# Patient Record
Sex: FEMALE | Race: BLACK OR AFRICAN AMERICAN | NOT HISPANIC OR LATINO | Employment: OTHER | URBAN - METROPOLITAN AREA
[De-identification: names, ages, dates, MRNs, and addresses within clinical notes are randomized per-mention and may not be internally consistent; named-entity substitution may affect disease eponyms.]

---

## 2024-09-14 ENCOUNTER — OFFICE VISIT (OUTPATIENT)
Dept: URGENT CARE | Age: 69
End: 2024-09-14
Payer: MEDICARE

## 2024-09-14 VITALS — DIASTOLIC BLOOD PRESSURE: 72 MMHG | SYSTOLIC BLOOD PRESSURE: 115 MMHG | HEART RATE: 72 BPM | TEMPERATURE: 98.8 F

## 2024-09-14 DIAGNOSIS — J01.90 ACUTE NON-RECURRENT SINUSITIS, UNSPECIFIED LOCATION: Primary | ICD-10-CM

## 2024-09-14 PROCEDURE — 1159F MED LIST DOCD IN RCRD: CPT | Performed by: PHYSICIAN ASSISTANT

## 2024-09-14 PROCEDURE — 99203 OFFICE O/P NEW LOW 30 MIN: CPT | Performed by: PHYSICIAN ASSISTANT

## 2024-09-14 PROCEDURE — 1160F RVW MEDS BY RX/DR IN RCRD: CPT | Performed by: PHYSICIAN ASSISTANT

## 2024-09-14 PROCEDURE — 87811 SARS-COV-2 COVID19 W/OPTIC: CPT | Performed by: PHYSICIAN ASSISTANT

## 2024-09-14 RX ORDER — DOXYCYCLINE 100 MG/1
100 CAPSULE ORAL 2 TIMES DAILY
Qty: 20 CAPSULE | Refills: 0 | Status: SHIPPED | OUTPATIENT
Start: 2024-09-14 | End: 2024-09-24

## 2024-09-14 NOTE — PATIENT INSTRUCTIONS
Most of the time, sinusitis does NOT need to be treated with antibiotics. This is because most sinusitis is caused by viruses - not bacteria - and antibiotics do not kill viruses. Many people get over sinus infections without antibiotics.  Some people with sinusitis do need treatment with antibiotics. If your symptoms have not improved after 10 - 14 days, please see your Primary Care Physician. Your doctor might recommend that you wait 1 more week to see if your symptoms improve. But if you have symptoms such as a fever, he or she might prescribe antibiotics. It is important to follow your doctor's instructions about taking your antibiotics.    Sinusitis is a condition that can cause a stuffy nose, pain in the face, and discharge (mucus) from the nose. The sinuses are hollow areas in the bones of the face. They have a thin lining that normally makes a small amount of mucus. When this lining gets irritated or infected, it swells and makes extra mucus. This causes symptoms.  Sinusitis can occur when a person gets sick with a cold. The germs causing the cold can also infect the sinuses. Many times, a person feels like his or her cold is getting better. But then he or she gets sinusitis and begins to feel sick again.    Common symptoms of sinusitis include:  -Stuffy or blocked nose  -Thick white, yellow, or green discharge from the no  -Pain in the teeth  -Pain or pressure in the face - This often feels worse when a person bends forward.    People with sinusitis can also have other symptoms that include:  -Fever  -Coughi  -Trouble smelling  -Ear pressure or fullness  -Headache  -Bad breath  -Feeling tired    Most of the time, symptoms start to improve in 7 to 10 days.    To reduce your symptoms, you you could ask I can change it if she does not want it I was wrong  can:  -Take an over-the-counter pain reliever to reduce the pain  -Rinse your nose and sinuses with salt water a few times a day - Ask your doctor or nurse  about the best way to do this.  Your doctor might also prescribe a steroid nose spray to reduce the swelling in your nose.     Home Care:   1. Take medication as prescribed.  2. Increase fluids, to help thin congestion.  3. You might use a cool mist humidifier/vaporizer in your room if the air is dry. This will help thin congestion and keep your sinus moist.   4. Sleeping in a more upright position can be helpful.   5. Using saline nose drops or mists can also help thick sinus congestion drain. Apply the mist or drops, then tip your head back and rotate from side to side to help    6. You can use acetaminophen (paracetamol, Tylenol) or ibuprofen (Motrin) for fever or headache.   7. Drink warm teas with honey.  8. If you do not improve or you begin to worsen please follow up with your primary care physician.    Call your doctor or go to the emergency department if worse or:  1. Breathing trouble occurs.   2. Color is pale, bluish, or gray.   3. Child is weak or too sleepy.   4. No urination occurs in 12 hours.   5. Fever lasts for more than 2 days.

## 2024-09-14 NOTE — PROGRESS NOTES
Subjective   Patient ID: Jeri Vazquez is a 69 y.o. female. They present today with a chief complaint of Sinus Problem (Day 6- nasal congestion with burning sensation, yellow mucus, with post nasal drip ).    History of Present Illness  HPI  Patient presents to the urgent care with approximately 1 week history of left-sided nasal congestion, burning, pressure.  Patient reports she feels pressure in her forehead, and entire left side of her face.  Patient states her sinus drainage is yellow, and has a foul taste and odor.  Past Medical History  Allergies as of 09/14/2024 - Reviewed 09/14/2024   Allergen Reaction Noted    Shellfish derived Unknown 09/14/2024    Penicillins Rash 09/14/2024       (Not in a hospital admission)       Past Medical History:   Diagnosis Date    Anemia, unspecified 03/19/2014    Anemia    Encounter for screening mammogram for malignant neoplasm of breast     Visit for screening mammogram    Other specified abnormal findings of blood chemistry 06/20/2013    Abnormal liver function test    Personal history of other diseases of the digestive system     History of intestinal obstruction    Personal history of other endocrine, nutritional and metabolic disease     History of hyperlipidemia    Personal history of other endocrine, nutritional and metabolic disease 06/20/2013    History of type 2 diabetes mellitus    Personal history of other endocrine, nutritional and metabolic disease 06/20/2013    History of hypothyroidism    Personal history of other endocrine, nutritional and metabolic disease 06/20/2013    History of obesity    Personal history of other infectious and parasitic diseases 10/15/2013    History of trichomoniasis    Personal history of other mental and behavioral disorders     History of anxiety disorder    Personal history of other mental and behavioral disorders     History of depression    Proteinuria, unspecified     Microalbuminuria    Trichomonal vulvovaginitis 06/27/2013     Trichomonal vulvovaginitis    Vitamin D deficiency, unspecified     Vitamin D deficiency       Past Surgical History:   Procedure Laterality Date    BREAST LUMPECTOMY  2013    Breast Surgery Lumpectomy     SECTION, CLASSIC  10/15/2013     Section    COLONOSCOPY  2013    Complete Colonoscopy    TOTAL ABDOMINAL HYSTERECTOMY  2013    Total Abdominal Hysterectomy    TUBAL LIGATION  10/15/2013    Tubal Ligation        reports that she has never smoked. She does not have any smokeless tobacco history on file. She reports that she does not drink alcohol and does not use drugs.    Review of Systems  Review of Systems  Patient denies sore throat, fever, nausea, diarrhea, vomiting, rash, dizziness, shortness of breath, increased urinary frequency, chills, peripheral edema, abdominal pain, chest pain.            Objective    Vitals:    24 1045   BP: 115/72   BP Location: Left arm   Patient Position: Sitting   BP Cuff Size: Large adult   Pulse: 72   Temp: 37.1 °C (98.8 °F)   TempSrc: Oral     No LMP recorded.    Physical Exam  Appearance: Alert, oriented, cooperative, in no acute distress. Well nourished & well hydrated.    Skin: Intact, no lesions, rash, petechiae or purpura.     Eyes: Conjunctiva pink with no redness or exudates. Eyelids without lesions. No scleral icterus.     ENT: Hearing grossly intact. External inspection of ears without lesions or erythema. no nasal flaring. no tripoding, no drooling, no difficulty swallowing oral secretions.  Bilateral tympanic membrane's nonbulging, external auditory canals unremarkable, no cervical or submandibular lymphadenopathy    Pulmonary: Clear bilaterally with good chest wall excursion. No rales, rhonchi or wheezing. No accessory muscle use or stridor. No difficulty completing a full sentence without taking a breath, no labored breathing w ambulation     Cardiac: Normal S1, S2, no rub, gallop. No JVD.    Musculoskeletal: no edema, or  deformity. No cyanosis, clubbing.    Neurological: no focal findings identified.    Psychiatric: Appropriate mood and affect.    Procedures    Point of Care Test & Imaging Results from this visit  No results found for this visit on 09/14/24.   No results found.    Diagnostic study results (if any) were reviewed by Hermila Pulido PA-C.    Assessment/Plan   Allergies, medications, history, and pertinent labs/EKGs/Imaging reviewed by Hermila Pulido PA-C.     Medical Decision Making  Considerations for patient's symptoms include viral/bacterial infection, and seasonal allergies. Given symptom onset/length, progression, and lack of systemic viral symptoms at this time, bacterial sinusitis considered the most likely cause. Patient will begin treatment with Doxycyline. If symptoms are not improving or if they are worsening the patient will call their primary care physician and go to the ER. Patient verbalizes understanding and agrees with plan of care. All questions were answered.    Dictation software was used in the creation of this note which does not evaluate or correct for typographical, spelling, syntax or grammatical errors.    Orders and Diagnoses  Diagnoses and all orders for this visit:  Acute non-recurrent sinusitis, unspecified location  -     doxycycline (Vibramycin) 100 mg capsule; Take 1 capsule (100 mg) by mouth 2 times a day for 10 days. Take with at least 8 ounces (large glass) of water, do not lie down for 30 minutes after  -     POCT Covid-19 Rapid Antigen  -     POCT rapid strep A manually resulted      Medical Admin Record      Follow Up Instructions  No follow-ups on file.    Patient disposition: Home    Electronically signed by Hermila Pulido PA-C  7:54 AM

## 2024-11-13 ENCOUNTER — OFFICE VISIT (OUTPATIENT)
Dept: URGENT CARE | Age: 69
End: 2024-11-13
Payer: MEDICARE

## 2024-11-13 DIAGNOSIS — J01.01 ACUTE RECURRENT MAXILLARY SINUSITIS: Primary | ICD-10-CM

## 2024-11-13 PROCEDURE — 1159F MED LIST DOCD IN RCRD: CPT | Performed by: PHYSICIAN ASSISTANT

## 2024-11-13 PROCEDURE — 1160F RVW MEDS BY RX/DR IN RCRD: CPT | Performed by: PHYSICIAN ASSISTANT

## 2024-11-13 PROCEDURE — 99070 SPECIAL SUPPLIES PHYS/QHP: CPT | Performed by: PHYSICIAN ASSISTANT

## 2024-11-13 PROCEDURE — 99213 OFFICE O/P EST LOW 20 MIN: CPT | Performed by: PHYSICIAN ASSISTANT

## 2024-11-13 RX ORDER — DOXYCYCLINE 100 MG/1
100 CAPSULE ORAL 2 TIMES DAILY
Qty: 20 CAPSULE | Refills: 0 | Status: SHIPPED | OUTPATIENT
Start: 2024-11-13 | End: 2024-11-23

## 2024-11-13 NOTE — PATIENT INSTRUCTIONS
Most of the time, sinusitis does NOT need to be treated with antibiotics. This is because most sinusitis is caused by viruses - not bacteria - and antibiotics do not kill viruses. Many people get over sinus infections without antibiotics.  Some people with sinusitis do need treatment with antibiotics. If your symptoms have not improved after 10 - 14 days, please see your Primary Care Physician. Your doctor might recommend that you wait 1 more week to see if your symptoms improve. But if you have symptoms such as a fever, he or she might prescribe antibiotics. It is important to follow your doctor's instructions about taking your antibiotics.    Sinusitis is a condition that can cause a stuffy nose, pain in the face, and discharge (mucus) from the nose. The sinuses are hollow areas in the bones of the face. They have a thin lining that normally makes a small amount of mucus. When this lining gets irritated or infected, it swells and makes extra mucus. This causes symptoms.  Sinusitis can occur when a person gets sick with a cold. The germs causing the cold can also infect the sinuses. Many times, a person feels like his or her cold is getting better. But then he or she gets sinusitis and begins to feel sick again.    Common symptoms of sinusitis include:  -Stuffy or blocked nose  -Thick white, yellow, or green discharge from the no  -Pain in the teeth  -Pain or pressure in the face - This often feels worse when a person bends forward.    People with sinusitis can also have other symptoms that include:  -Fever  -Coughing  -Trouble smelling  -Ear pressure or fullness  -Headache  -Bad breath  -Feeling tired    Most of the time, symptoms start to improve in 7 to 10 days.    To reduce your symptoms, you you could ask I can change it if she does not want it I was wrong  can:  -Take an over-the-counter pain reliever to reduce the pain  -Rinse your nose and sinuses with salt water a few times a day - Ask your doctor or nurse  about the best way to do this.  Your doctor might also prescribe a steroid nose spray to reduce the swelling in your nose.     Home Care:   1. Take medication as prescribed.  2. Increase fluids, to help thin congestion.  3. You might use a cool mist humidifier/vaporizer in your room if the air is dry. This will help thin congestion and keep your sinus moist.   4. Sleeping in a more upright position can be helpful.   5. Using saline nose drops or mists can also help thick sinus congestion drain. Apply the mist or drops, then tip your head back and rotate from side to side to help    6. You can use acetaminophen (paracetamol, Tylenol) or ibuprofen (Motrin) for fever or headache.   7. Drink warm teas with honey.  8. If you do not improve or you begin to worsen please follow up with your primary care physician.    Call your doctor or go to the emergency department if worse or:  1. Breathing trouble occurs.   2. Color is pale, bluish, or gray.   3. Child is weak or too sleepy.   4. No urination occurs in 12 hours.   5. Fever lasts for more than 2 days.

## 2024-11-13 NOTE — PROGRESS NOTES
Subjective   Patient ID: Jeri Vazquez is a 69 y.o. female. They present today with a chief complaint of Generalized Body Aches and Sinus Problem (Post nasal drip and sneezing x 1 week).    History of Present Illness  HPI  Patient presents to the urgent care with 1 week history of left sided facial pressure, congestion, radiating into the left side of her jaw.  Patient reports having sinus infections recur on the left side of her face in the past, most recently was in September.  Patient reports the symptoms did fully resolve with the antibiotic.      Past Medical History  Allergies as of 11/13/2024 - Reviewed 11/13/2024   Allergen Reaction Noted    Shellfish derived Unknown 09/14/2024    Penicillins Rash 09/14/2024       (Not in a hospital admission)       Past Medical History:   Diagnosis Date    Anemia, unspecified 03/19/2014    Anemia    Encounter for screening mammogram for malignant neoplasm of breast     Visit for screening mammogram    Other specified abnormal findings of blood chemistry 06/20/2013    Abnormal liver function test    Personal history of other diseases of the digestive system     History of intestinal obstruction    Personal history of other endocrine, nutritional and metabolic disease     History of hyperlipidemia    Personal history of other endocrine, nutritional and metabolic disease 06/20/2013    History of type 2 diabetes mellitus    Personal history of other endocrine, nutritional and metabolic disease 06/20/2013    History of hypothyroidism    Personal history of other endocrine, nutritional and metabolic disease 06/20/2013    History of obesity    Personal history of other infectious and parasitic diseases 10/15/2013    History of trichomoniasis    Personal history of other mental and behavioral disorders     History of anxiety disorder    Personal history of other mental and behavioral disorders     History of depression    Proteinuria, unspecified     Microalbuminuria    Trichomonal  vulvovaginitis 2013    Trichomonal vulvovaginitis    Vitamin D deficiency, unspecified     Vitamin D deficiency       Past Surgical History:   Procedure Laterality Date    BREAST LUMPECTOMY  2013    Breast Surgery Lumpectomy     SECTION, CLASSIC  10/15/2013     Section    COLONOSCOPY  2013    Complete Colonoscopy    TOTAL ABDOMINAL HYSTERECTOMY  2013    Total Abdominal Hysterectomy    TUBAL LIGATION  10/15/2013    Tubal Ligation        reports that she has never smoked. She does not have any smokeless tobacco history on file. She reports that she does not drink alcohol and does not use drugs.    Review of Systems  Review of Systems     Patient denies sore throat, fever, nausea, diarrhea, vomiting, rash, dizziness, shortness of breath, increased urinary frequency, chills, peripheral edema, abdominal pain, chest pain.                          Objective    There were no vitals filed for this visit.  No LMP recorded.    Physical Exam  Appearance: Alert, oriented, cooperative, in no acute distress. Well nourished & well hydrated.    Skin: Intact, no lesions, rash, petechiae or purpura.     Eyes: Conjunctiva pink with no redness or exudates. Eyelids without lesions. No scleral icterus.     ENT: Hearing grossly intact. External inspection of ears without lesions or erythema. no nasal flaring. Posterior oropharynx no erythema, or oral lesions, uvula midline nonerythematous, no tripoding, no drooling, no difficulty swallowing oral secretions. tenderness to palpation frontal and maxillary sinus, no cervical lymphadenopathy, bilateral tympanic membranes nonbulging, external auditory canals nonerythematous, no visible broken teeth, or oral lesions    Pulmonary: good chest wall excursion. No wheezing. No accessory muscle use or stridor.    Cardiac: Normal rate    Musculoskeletal: no pain, edema, or deformity. No cyanosis, clubbing.    Neurological: no focal findings  identified.    Psychiatric: Appropriate mood and affect.    Procedures    Point of Care Test & Imaging Results from this visit  No results found for this visit on 11/13/24.   No results found.    Diagnostic study results (if any) were reviewed by Hermila Pulido PA-C.    Assessment/Plan   Allergies, medications, history, and pertinent labs/EKGs/Imaging reviewed by Hermila Pulido PA-C.     Medical Decision Making  Considerations for patient's symptoms include viral/bacterial infection, and seasonal allergies. Given symptom onset/length, progression, and lack of systemic viral symptoms at this time, bacterial sinusitis considered the most likely cause. Patient will begin treatment with doxycycline.  Patient declined referral for ENT.  Recommend patient follow-up with PCP due to history of reoccurrence. If symptoms are not improving or if they are worsening the patient will call their primary care physician and go to the ER. Patient verbalizes understanding and agrees with plan of care. All questions were answered.    Dictation software was used in the creation of this note which does not evaluate or correct for typographical, spelling, syntax or grammatical errors.      Orders and Diagnoses  There are no diagnoses linked to this encounter.    Medical Admin Record      Patient disposition: Home    Electronically signed by Hermila Pulido PA-C  1:46 PM